# Patient Record
Sex: FEMALE | Race: WHITE | NOT HISPANIC OR LATINO | URBAN - METROPOLITAN AREA
[De-identification: names, ages, dates, MRNs, and addresses within clinical notes are randomized per-mention and may not be internally consistent; named-entity substitution may affect disease eponyms.]

---

## 2021-10-14 ENCOUNTER — GYNECOLOGY VISIT (OUTPATIENT)
Dept: OBGYN | Facility: CLINIC | Age: 31
End: 2021-10-14
Payer: COMMERCIAL

## 2021-10-14 ENCOUNTER — HOSPITAL ENCOUNTER (OUTPATIENT)
Facility: MEDICAL CENTER | Age: 31
End: 2021-10-14
Attending: OBSTETRICS & GYNECOLOGY
Payer: COMMERCIAL

## 2021-10-14 VITALS — WEIGHT: 165 LBS | SYSTOLIC BLOOD PRESSURE: 135 MMHG | DIASTOLIC BLOOD PRESSURE: 62 MMHG

## 2021-10-14 DIAGNOSIS — Z11.3 SCREENING FOR VENEREAL DISEASE: ICD-10-CM

## 2021-10-14 DIAGNOSIS — Z12.4 SCREENING FOR CERVICAL CANCER: ICD-10-CM

## 2021-10-14 DIAGNOSIS — Z01.419 WELL WOMAN EXAM WITH ROUTINE GYNECOLOGICAL EXAM: ICD-10-CM

## 2021-10-14 PROCEDURE — 87624 HPV HI-RISK TYP POOLED RSLT: CPT

## 2021-10-14 PROCEDURE — 87591 N.GONORRHOEAE DNA AMP PROB: CPT

## 2021-10-14 PROCEDURE — 99385 PREV VISIT NEW AGE 18-39: CPT | Performed by: OBSTETRICS & GYNECOLOGY

## 2021-10-14 PROCEDURE — 87491 CHLMYD TRACH DNA AMP PROBE: CPT

## 2021-10-14 PROCEDURE — 88175 CYTOPATH C/V AUTO FLUID REDO: CPT

## 2021-10-14 NOTE — PROGRESS NOTES
Subjective     Becka Arora is a 31 y.o. female who presents with Gynecologic Exam        CC: annual exam    HPI: 31-year-old  0, last menstrual period 2021, using Mirena IUD for contraception, presents for annual exam.  The patient states she has had her Mirena IUD for approximately 3 years.  Menses are currently every 47 to 72 days, lasting 2 days.  She has occasional cramping associated with the IUD.  She states the strings are coiled up in the cervix, and position has been checked by ultrasound.  She reports history of one abnormal Pap smear in 2017, which was retested and came back normal.  She requests screening for sexually transmitted infections.  Her partner has been evaluated for penile discharge, was found to have mycoplasma.  Patient denies discharge or vaginal lesions.  Family history is unremarkable for breast cancer.    History reviewed. No pertinent past medical history.    Past Surgical History:   Procedure Laterality Date   • ACL REPAIR     • CHOLECYSTECTOMY         No current outpatient medications on file.    Patient has no known allergies.    Family History   Problem Relation Age of Onset   • No Known Problems Mother    • Heart Disease Father        Social History     Socioeconomic History   • Marital status: Single     Spouse name: Not on file   • Number of children: Not on file   • Years of education: Not on file   • Highest education level: Not on file   Occupational History   • Not on file   Tobacco Use   • Smoking status: Never Smoker   • Smokeless tobacco: Never Used   Vaping Use   • Vaping Use: Never used   Substance and Sexual Activity   • Alcohol use: Yes   • Drug use: Never   • Sexual activity: Yes     Partners: Male     Birth control/protection: I.U.D.   Other Topics Concern   • Not on file   Social History Narrative   • Not on file     Social Determinants of Health     Financial Resource Strain:    • Difficulty of Paying Living Expenses:    Food Insecurity:    •  Worried About Running Out of Food in the Last Year:    • Ran Out of Food in the Last Year:    Transportation Needs:    • Lack of Transportation (Medical):    • Lack of Transportation (Non-Medical):    Physical Activity:    • Days of Exercise per Week:    • Minutes of Exercise per Session:    Stress:    • Feeling of Stress :    Social Connections:    • Frequency of Communication with Friends and Family:    • Frequency of Social Gatherings with Friends and Family:    • Attends Synagogue Services:    • Active Member of Clubs or Organizations:    • Attends Club or Organization Meetings:    • Marital Status:    Intimate Partner Violence:    • Fear of Current or Ex-Partner:    • Emotionally Abused:    • Physically Abused:    • Sexually Abused:        OB History    Para Term  AB Living   0 0 0 0 0 0   SAB TAB Ectopic Molar Multiple Live Births   0 0 0 0 0 0       ROS REVIEW OF SYSTEMS:    Pertinent positives and negatives mentioned in HPI.    All other systems reviewed and are negative or noncontributory.           Objective     /62 (BP Location: Right arm, Patient Position: Sitting)   Wt 74.8 kg (165 lb)   LMP 2021   Breastfeeding No      Physical Exam      GENERAL: Alert, in no apparent distress  PSYCHIATRIC: Appropriate affect, intact insight and judgement.  NECK:  Nontender, no masses.  No Thyromegaly or nodules. No lymphadenopathy.  RESPIRATORY: Normal respiratory effort.  Lungs clear to auscultation.   CARDIOVASCULAR: RRR, no murmur, gallop, or rub.  ABDOMEN: Soft, nontender, nondistended.  No palpable masses.  No rebound or guarding.  No inguinal lymphadenopathy.  No hepatosplenomegaly.  No hernias.  BACK: No CVA tenderness  EXTREMITIES: No edema  SKIN: No rash    BREAST: No masses or tenderness.  No skin changes.  No nipple inversion or discharge. No axillary lymphadenopathy.      GENITOURINARY:  Normal external genitalia, no lesions.  Normal urethral meatus, no masses or  tenderness.  Normal bladder without fullness or masses.  Vagina well estrogenized, no vaginal discharge or lesions.  Cervix without lesions or discharge, nontender.  IUD strings not visualized.  Uterus normal size, shape, and contour, nontender.  Adnexa nontender, no masses.  Normal anus and perineum.    Rectal Exam - not indicated.            Assessment & Plan        1. Well woman exam with routine gynecological exam  Reviewed monthly self breast exams and need for yearly screening mammograms starting at age 40.  Discussed calcium intake, Vitamin D,  and weight bearing exercise for bone health.  Mirena IUD in place -strings not visualized, patient states proper position has been verified with ultrasound.    2. Screening for cervical cancer  - THINPREP PAP W/HPV AND CTNG; Future    3. Screening for venereal disease   Screening for sexually transmitted infections per patient request  - THINPREP PAP W/HPV AND CTNG; Future  - T.PALLIDUM AB EIA; Future  - HIV AG/AB COMBO ASSAY SCREENING; Future  - HEP C VIRUS ANTIBODY; Future        Follow-up as needed

## 2021-10-15 DIAGNOSIS — Z12.4 SCREENING FOR CERVICAL CANCER: ICD-10-CM

## 2021-10-15 DIAGNOSIS — Z11.3 SCREENING FOR VENEREAL DISEASE: ICD-10-CM

## 2021-10-16 LAB
C TRACH DNA GENITAL QL NAA+PROBE: NEGATIVE
CYTOLOGY REG CYTOL: NORMAL
HPV HR 12 DNA CVX QL NAA+PROBE: NEGATIVE
HPV16 DNA SPEC QL NAA+PROBE: NEGATIVE
HPV18 DNA SPEC QL NAA+PROBE: NEGATIVE
N GONORRHOEA DNA GENITAL QL NAA+PROBE: NEGATIVE
SPECIMEN SOURCE: NORMAL
SPECIMEN SOURCE: NORMAL

## 2021-10-23 ENCOUNTER — PATIENT MESSAGE (OUTPATIENT)
Dept: OBGYN | Facility: CLINIC | Age: 31
End: 2021-10-23

## 2021-10-26 RX ORDER — METRONIDAZOLE 500 MG/1
500 TABLET ORAL 2 TIMES DAILY
Qty: 14 TABLET | Refills: 0 | Status: SHIPPED | OUTPATIENT
Start: 2021-10-26 | End: 2023-09-06

## 2023-09-04 SDOH — HEALTH STABILITY: PHYSICAL HEALTH: ON AVERAGE, HOW MANY MINUTES DO YOU ENGAGE IN EXERCISE AT THIS LEVEL?: 30 MIN

## 2023-09-04 SDOH — ECONOMIC STABILITY: HOUSING INSECURITY
IN THE LAST 12 MONTHS, WAS THERE A TIME WHEN YOU DID NOT HAVE A STEADY PLACE TO SLEEP OR SLEPT IN A SHELTER (INCLUDING NOW)?: NO

## 2023-09-04 SDOH — ECONOMIC STABILITY: INCOME INSECURITY: IN THE LAST 12 MONTHS, WAS THERE A TIME WHEN YOU WERE NOT ABLE TO PAY THE MORTGAGE OR RENT ON TIME?: NO

## 2023-09-04 SDOH — ECONOMIC STABILITY: HOUSING INSECURITY: IN THE LAST 12 MONTHS, HOW MANY PLACES HAVE YOU LIVED?: 2

## 2023-09-04 SDOH — ECONOMIC STABILITY: FOOD INSECURITY: WITHIN THE PAST 12 MONTHS, THE FOOD YOU BOUGHT JUST DIDN'T LAST AND YOU DIDN'T HAVE MONEY TO GET MORE.: NEVER TRUE

## 2023-09-04 SDOH — ECONOMIC STABILITY: TRANSPORTATION INSECURITY
IN THE PAST 12 MONTHS, HAS LACK OF TRANSPORTATION KEPT YOU FROM MEETINGS, WORK, OR FROM GETTING THINGS NEEDED FOR DAILY LIVING?: NO

## 2023-09-04 SDOH — HEALTH STABILITY: PHYSICAL HEALTH: ON AVERAGE, HOW MANY DAYS PER WEEK DO YOU ENGAGE IN MODERATE TO STRENUOUS EXERCISE (LIKE A BRISK WALK)?: 3 DAYS

## 2023-09-04 SDOH — ECONOMIC STABILITY: TRANSPORTATION INSECURITY
IN THE PAST 12 MONTHS, HAS THE LACK OF TRANSPORTATION KEPT YOU FROM MEDICAL APPOINTMENTS OR FROM GETTING MEDICATIONS?: NO

## 2023-09-04 SDOH — ECONOMIC STABILITY: FOOD INSECURITY: WITHIN THE PAST 12 MONTHS, YOU WORRIED THAT YOUR FOOD WOULD RUN OUT BEFORE YOU GOT MONEY TO BUY MORE.: NEVER TRUE

## 2023-09-04 SDOH — ECONOMIC STABILITY: TRANSPORTATION INSECURITY
IN THE PAST 12 MONTHS, HAS LACK OF RELIABLE TRANSPORTATION KEPT YOU FROM MEDICAL APPOINTMENTS, MEETINGS, WORK OR FROM GETTING THINGS NEEDED FOR DAILY LIVING?: NO

## 2023-09-04 SDOH — ECONOMIC STABILITY: INCOME INSECURITY: HOW HARD IS IT FOR YOU TO PAY FOR THE VERY BASICS LIKE FOOD, HOUSING, MEDICAL CARE, AND HEATING?: NOT HARD AT ALL

## 2023-09-04 SDOH — HEALTH STABILITY: MENTAL HEALTH
STRESS IS WHEN SOMEONE FEELS TENSE, NERVOUS, ANXIOUS, OR CAN'T SLEEP AT NIGHT BECAUSE THEIR MIND IS TROUBLED. HOW STRESSED ARE YOU?: TO SOME EXTENT

## 2023-09-04 ASSESSMENT — SOCIAL DETERMINANTS OF HEALTH (SDOH)
ARE YOU MARRIED, WIDOWED, DIVORCED, SEPARATED, NEVER MARRIED, OR LIVING WITH A PARTNER?: LIVING WITH PARTNER
HOW OFTEN DO YOU ATTEND CHURCH OR RELIGIOUS SERVICES?: 1 TO 4 TIMES PER YEAR
HOW HARD IS IT FOR YOU TO PAY FOR THE VERY BASICS LIKE FOOD, HOUSING, MEDICAL CARE, AND HEATING?: NOT HARD AT ALL
IN A TYPICAL WEEK, HOW MANY TIMES DO YOU TALK ON THE PHONE WITH FAMILY, FRIENDS, OR NEIGHBORS?: ONCE A WEEK
HOW OFTEN DO YOU GET TOGETHER WITH FRIENDS OR RELATIVES?: ONCE A WEEK
DO YOU BELONG TO ANY CLUBS OR ORGANIZATIONS SUCH AS CHURCH GROUPS UNIONS, FRATERNAL OR ATHLETIC GROUPS, OR SCHOOL GROUPS?: YES
IN A TYPICAL WEEK, HOW MANY TIMES DO YOU TALK ON THE PHONE WITH FAMILY, FRIENDS, OR NEIGHBORS?: ONCE A WEEK
HOW OFTEN DO YOU HAVE SIX OR MORE DRINKS ON ONE OCCASION: LESS THAN MONTHLY
HOW OFTEN DO YOU HAVE A DRINK CONTAINING ALCOHOL: MONTHLY OR LESS
DO YOU BELONG TO ANY CLUBS OR ORGANIZATIONS SUCH AS CHURCH GROUPS UNIONS, FRATERNAL OR ATHLETIC GROUPS, OR SCHOOL GROUPS?: YES
WITHIN THE PAST 12 MONTHS, YOU WORRIED THAT YOUR FOOD WOULD RUN OUT BEFORE YOU GOT THE MONEY TO BUY MORE: NEVER TRUE
HOW OFTEN DO YOU ATTENT MEETINGS OF THE CLUB OR ORGANIZATION YOU BELONG TO?: MORE THAN 4 TIMES PER YEAR
HOW OFTEN DO YOU GET TOGETHER WITH FRIENDS OR RELATIVES?: ONCE A WEEK
HOW MANY DRINKS CONTAINING ALCOHOL DO YOU HAVE ON A TYPICAL DAY WHEN YOU ARE DRINKING: 1 OR 2
HOW OFTEN DO YOU ATTEND CHURCH OR RELIGIOUS SERVICES?: 1 TO 4 TIMES PER YEAR
ARE YOU MARRIED, WIDOWED, DIVORCED, SEPARATED, NEVER MARRIED, OR LIVING WITH A PARTNER?: LIVING WITH PARTNER
HOW OFTEN DO YOU ATTENT MEETINGS OF THE CLUB OR ORGANIZATION YOU BELONG TO?: MORE THAN 4 TIMES PER YEAR

## 2023-09-04 ASSESSMENT — LIFESTYLE VARIABLES
HOW OFTEN DO YOU HAVE A DRINK CONTAINING ALCOHOL: MONTHLY OR LESS
SKIP TO QUESTIONS 9-10: 0
HOW OFTEN DO YOU HAVE SIX OR MORE DRINKS ON ONE OCCASION: LESS THAN MONTHLY
HOW MANY STANDARD DRINKS CONTAINING ALCOHOL DO YOU HAVE ON A TYPICAL DAY: 1 OR 2
AUDIT-C TOTAL SCORE: 2

## 2023-09-06 ENCOUNTER — OFFICE VISIT (OUTPATIENT)
Dept: SPORTS MEDICINE | Facility: CLINIC | Age: 33
End: 2023-09-06
Payer: COMMERCIAL

## 2023-09-06 ENCOUNTER — APPOINTMENT (OUTPATIENT)
Dept: RADIOLOGY | Facility: IMAGING CENTER | Age: 33
End: 2023-09-06
Attending: FAMILY MEDICINE
Payer: COMMERCIAL

## 2023-09-06 VITALS
BODY MASS INDEX: 27.49 KG/M2 | SYSTOLIC BLOOD PRESSURE: 116 MMHG | WEIGHT: 165 LBS | HEART RATE: 77 BPM | TEMPERATURE: 97.9 F | OXYGEN SATURATION: 98 % | DIASTOLIC BLOOD PRESSURE: 74 MMHG | RESPIRATION RATE: 16 BRPM | HEIGHT: 65 IN

## 2023-09-06 DIAGNOSIS — M54.2 CHRONIC NECK PAIN: ICD-10-CM

## 2023-09-06 DIAGNOSIS — G89.29 CHRONIC NECK PAIN: ICD-10-CM

## 2023-09-06 PROCEDURE — 3078F DIAST BP <80 MM HG: CPT | Performed by: FAMILY MEDICINE

## 2023-09-06 PROCEDURE — 99213 OFFICE O/P EST LOW 20 MIN: CPT | Performed by: FAMILY MEDICINE

## 2023-09-06 PROCEDURE — 3074F SYST BP LT 130 MM HG: CPT | Performed by: FAMILY MEDICINE

## 2023-09-06 PROCEDURE — 72040 X-RAY EXAM NECK SPINE 2-3 VW: CPT | Mod: TC | Performed by: RADIOLOGY

## 2023-09-06 ASSESSMENT — ENCOUNTER SYMPTOMS: FEVER: 0

## 2023-09-06 NOTE — PROGRESS NOTES
Subjective:     Becka Arora is a 32 y.o. female who presents for Neck Pain (Neck & head pain )    HPI  Pt presents for evaluation of chronic neck pain   Pain starts in the posterior upper neck and radiates into the head at times  Pain is usually on the right    Also has pain in the shoulder blade area, more often right   Symptoms are worse after she has been traveling (travels for work)   Neck is painful, but without stiffness   No swelling, bruising, or erythema in the area     Has headaches at times and feels that they are related to her neck pain   Treated for migraines with abortive agents in the past and didn't help   Headaches can last half of a day up to 2 days   Has some light sensitivity during headache   Has a pulsing type pain in the head   Takes Excedrin and helps   Thinks that she had a CT of her head in the past, but might of been an MRI and also might have also been on the neck instead of the head    Worked with a PT in the past (about 8 year ago) who was able to temporarily improve headaches     Review of Systems   Constitutional:  Negative for fever.   Skin:  Negative for rash.       PMH:  has no past medical history of Addisons disease ([facility]), Adrenal disorder ([facility]), Allergy, Anemia, Anxiety, Arrhythmia, Arthritis, Asthma, Blood transfusion without reported diagnosis, Cancer ([facility]), Cataract, CHF (congestive heart failure) ([facility]), Clotting disorder ([facility]), COPD (chronic obstructive pulmonary disease) ([facility]), Cushings syndrome ([facility]), Depression, Diabetes ([facility]), Diabetic neuropathy ([facility]), GERD (gastroesophageal reflux disease), Glaucoma, Goiter, Head ache, Heart attack ([facility]), Heart murmur, HIV (human immunodeficiency virus infection) ([facility]), Hyperlipidemia, Hypertension, IBD (inflammatory bowel disease), Kidney disease, Meningitis, Migraine, Muscle disorder, Osteoporosis, Parathyroid disorder (HCC), Pituitary disease (HCC), Pulmonary emphysema (HCC), Seizure ([facility]), Sickle cell disease ([facility]), Stroke  "(HCC), Substance abuse (HCC), Thyroid disease, Tuberculosis, or Urinary tract infection.  MEDS: No current outpatient medications on file.  ALLERGIES: No Known Allergies  SURGHX:   Past Surgical History:   Procedure Laterality Date    CHOLECYSTECTOMY      REPAIR, KNEE, ACL       SOCHX:  reports that she has never smoked. She has never used smokeless tobacco. She reports current alcohol use. She reports that she does not use drugs.     Objective:   /74 (BP Location: Left arm, Patient Position: Sitting, BP Cuff Size: Adult)   Pulse 77   Temp 36.6 °C (97.9 °F) (Temporal)   Resp 16   Ht 1.651 m (5' 5\")   Wt 74.8 kg (165 lb)   SpO2 98%   BMI 27.46 kg/m²     Physical Exam  Constitutional:       General: She is not in acute distress.     Appearance: She is well-developed. She is not diaphoretic.   Pulmonary:      Effort: Pulmonary effort is normal.   Neurological:      Mental Status: She is alert.     Neck  General: No asymmetry, bruising, or erythema appreciated  ROM: FROM flex/extend/lateral bend/lateral rotation  Palpation: No TTP of spinous processes, no step-offs appreciated, +TTP of paraspinal muscles mostly in the right side and mostly at the base of the occipital region.  No significant tenderness over the parascapular region  Special tests: Neg Spurling's  Neuro: Sensation intact     Assessment/Plan:   Assessment    1. Chronic neck pain  - DX-CERVICAL SPINE-2 OR 3 VIEWS; Future  - Referral to Physical Therapy    Patient with chronic cervical pain and also chronic headaches.  Suspect that these are cervicogenic headaches, though they also have qualities of migraine.  Suspect that neck pain is triggering migraines and they are indeed separate entities.  Patient has great range of motion and function, and there is no concern for meningitis at this time.  X-rays in office obtained which are largely within normal limits.  Recommended working with physical therapist for a few weeks to see if pain can be " improved quite a bit.  As long as pain is improving, would not recommend additional advanced imaging of the neck.  However, if the pain is not improving quite a bit with PT, may need to consider further evaluation of the neck with MRI.  If neck pain is improving and headaches also improved, may not need any specialty referral for headaches.  However, if neck pain is improving and headaches continue, may need to consider referral to neurology.

## 2023-09-25 ENCOUNTER — PHYSICAL THERAPY (OUTPATIENT)
Dept: PHYSICAL THERAPY | Facility: MEDICAL CENTER | Age: 33
End: 2023-09-25
Attending: FAMILY MEDICINE
Payer: COMMERCIAL

## 2023-09-25 DIAGNOSIS — M54.2 CHRONIC NECK PAIN: ICD-10-CM

## 2023-09-25 DIAGNOSIS — G89.29 CHRONIC NECK PAIN: ICD-10-CM

## 2023-09-25 PROCEDURE — 97162 PT EVAL MOD COMPLEX 30 MIN: CPT

## 2023-09-25 PROCEDURE — 97110 THERAPEUTIC EXERCISES: CPT

## 2023-09-25 ASSESSMENT — ENCOUNTER SYMPTOMS
QUALITY: DULL ACHE
PAIN SCALE AT LOWEST: 0
POSTURAL HEADACHE: 1
PAIN SCALE AT HIGHEST: 7
PAIN SCALE: 2

## 2023-09-25 NOTE — OP THERAPY EVALUATION
"  Outpatient Physical Therapy  INITIAL EVALUATION    [facility]  [address]  [place] [address]  Phone:  [phone]  Fax:  [phone]    Date of Evaluation: 09/25/2023    Patient: Velma Arora  YOB: 1990  MRN: [id number]     Referring Provider: Cruz Manning M.D.  [address]  [address]  [place] [address]   Referring Diagnosis Cervicalgia [M54.2];Other chronic pain [G89.29]     Time Calculation  Start time: 0815  Stop time: 0910 Time Calculation (min): 55 minutes         Chief Complaint: Neck Problem    Visit Diagnoses     ICD-10-CM   1. Chronic neck pain  M54.2    G89.29       Date of onset of impairment: Multiple active episodes found    Subjective:   History of Present Illness:     Mechanism of injury:  Patient is a 32 year old female with a PMH including: ACL repair, cholecystectomy. Pt reporting had fall with snowboarding with head impact but was \"fine after a day.\"    Pt presents to therapy with complaints of chronic neck pain in upper neck with occasional radiation into head R>L. Pt reporting has had migraines for ten years; three weeks ago has had daily headaches. Additionally pain in R shoulder blade region. Pos for headaches. Per Dr. Manning's note on 9/6/23, \"Treated for migraines with abortive agents in the past and didn't help   Headaches can last half of a day up to 2 days   Has some light sensitivity during headache   Has a pulsing type pain in the head   Takes Excedrin and helps   Thinks that she had a CT of her head in the past, but might of been an MRI and also might have also been on the neck instead of the head   Worked with a PT in the past (about 8 year ago) who was able to temporarily improve headaches;\" this occurred in high school per pt report. Pt reporting feels like eyebrow \"has a bruise.\" Pt endorsing B hands fall asleep with sleeping; is able to decrease with getting up to " "change position. Pt reporting frequency is increasing; daily prev 1x/week. Intensity unchanged. Vision changes rare; denies dizziness other than with position changes.     Currently denies changes in bowel and bladder, saddle anesthesia, significant weight changes, chills/night sweats, nausea and vomiting, and unexplained fatigue. Denies history of cancer. Pt consents to evaluation and treatment today.           Headaches:  postural headache  Sleep disturbance:  Interrupted sleep  Pain:     Current pain ratin    At best pain ratin    At worst pain ratin    Location:  Upper neck with occasional radiation into head R>L. Additionally pain in R shoulder blade region. Pos for headaches.    Quality:  Dull ache (throbbing, pulsing)    Progression:  Worsening    Pain Comments::  Aggravating: traveling, reading, difficulty concentrating, driving, sleeping    Relieving: changing neck position-lying down side with neck \"at certain angle.\" Excedrin   Diagnostic Tests:     Diagnostic Tests Comments:  Cervical x ray 23:   FINDINGS: Alignment of the cervical spine is straight.  Vertebral body heights are preserved.  Intervertebral disc spaces preserved.  Osteophyte present anteriorly at the C3-4 level.  Small osteophytes of the C4-5 and C5-6 levels.  The facet joints show normal alignment.  Prevertebral soft tissues are within normal limits.  Odontoid and lateral masses of C1 are intact.  Cervicothoracic junction is intact.     IMPRESSION:     1.  Straightening and mild multilevel degenerative changes cervical spine.  Somewhat prominent anterior osteophytes at C3-4.  2.  No fracture or subluxation.    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  Past Surgical History:   Procedure Laterality Date    CHOLECYSTECTOMY      REPAIR, KNEE, ACL       Social History     Tobacco Use    Smoking status: Never    Smokeless tobacco: Never   Substance Use Topics    Alcohol use: Yes     Family and Occupational History     Socioeconomic History    Marital status: Single     Spouse name: Not on file    Number of children: Not on file    Years of education: Not on file    Highest education level: Bachelor's degree (e.g., BA, AB, BS)   Occupational History    Not on file       Objective     Postural Observations  Seated posture: poor  Correction of posture: has no consistent effect    Additional Postural Observation Details  Forward head and rounded shoulders    Shoulder Screen    Shoulder active range of motion within functional limits.  Shoulder range of motion within functional limits with the following exceptions: Tightness at end ranges without increase in s/s     Neurological Testing     Dermatome testing   Cervical (left)   All left cervical dermatomes intact    Cervical (right)   All right cervical dermatomes intact    Palpation     Additional Palpation Details  Palpation to suboccipitals with reproduction of R orbital pain     Active Range of Motion     Cervical Spine   Flexion: within functional limits  Extension: within functional limits  Retraction: within functional limits (pulling in shoulder blade)  Left lateral flexion: within functional limits  Right lateral flexion: within functional limits  Left rotation: decreased (56 deg)  Right rotation: within functional limits (70 deg)    Upper Cervical   Left lateral flexion: within functional limits  Right lateral flexion: within functional limits    Additional Active Range of Motion Details  Decreased range of motion with t/s side  "bending and rotation L>R        Strength:      Additional Strength Details  DNF testing: fatigues rapidly with compensation of c/s flexion     Tests     Left Shoulder   Negative Spurling's sign.     Right Shoulder   Negative Spurling's sign.         Therapeutic Exercises (CPT 39773):     1. pt education, re: posture education; PT exam findings    2. new hep as below      Therapeutic Exercise Summary: Access Code: [email or web address]  URL: [email or web address]  Date: 09/25/2023  Prepared by: Julio César Monzon    Exercises  - Correct Seated Posture  - 7 x weekly  - Neck Retraction  - 4-5 x daily - 7 x weekly - 1 sets - 10 reps  - Sidelying Thoracic Lumbar Rotation  - 2 x daily - 7 x weekly - 1 sets - 15 reps  - Open Book Chest Stretch on Towel Roll  - 2 x daily - 7 x weekly - 1 sets - 15 reps  - Shoulder External Rotation and Scapular Retraction with Resistance  - 2 x daily - 7 x weekly - 1 sets - 1-2 min  hold    Pt performed these exercises with instruction and SPV.  Provided handout with these exercises for daily HEP.          Time-based treatments/modalities:    Physical Therapy Timed Treatment Charges  Therapeutic exercise minutes (CPT 58255): 25 minutes      Assessment, Response and Plan:   Impairments: abnormal or restricted ROM, activity intolerance, impaired physical strength and lacks appropriate home exercise program    Assessment details:  Patient, \"Laura-see\" is a pleasant and cooperative 32 year old female who presents to therapy with c/o chronic worsening neck pain and headaches, incr frequency over past three weeks without clear JESSE. She is currently working a desk job.   Exam findings suggestive of cervicogenic headaches; additional findings of fair postural awareness, t/s hypomobility, strength deficits at DNF and periscapular region. Pt may benefit from skilled physical therapy in order to address above impairments in order to improve QOL and return to reported ADL's.     Barriers to therapy:  " Financial  Other barriers to therapy:  High ins estimate per visit code  Prognosis: good    Goals:   Short Term Goals:   1. Pt will be independent with written HEP.      Short term goal time span:  2-4 weeks      Long Term Goals:    1. Pt will be independent with written HEP.  2. Pt will have a sig improvement in NDI score (eval: 15)  3. Pt will have 70% improvement in sleep hygiene.  4. Pt will be able to tolerate whole work shift without onset of headache consistently at least 75% of the time or greater.      Long term goal time span:  6-8 weeks    Plan:   Therapy options:  Physical therapy treatment to continue  Planned therapy interventions:  Neuromuscular Re-education (CPT 74850), E Stim Unattended (CPT 65205), Manual Therapy (CPT 11800), Mechanical Traction (CPT 96181) and Therapeutic Exercise (CPT 55840)  Frequency:  1x week  Duration in weeks:  8  Discussed with:  Patient  Plan details:  UPOC: 11/24/23          Functional Assessment Used  Neck Disability Total: 15     Referring provider co-signature:  I have reviewed this plan of care and my co-signature certifies the need for services.    Certification Period: 09/25/2023 to  11/24/23    Physician Signature: ________________________________ Date: ______________

## 2023-09-29 ENCOUNTER — APPOINTMENT (OUTPATIENT)
Dept: PHYSICAL THERAPY | Facility: MEDICAL CENTER | Age: 33
End: 2023-09-29
Payer: COMMERCIAL

## 2023-10-03 ENCOUNTER — PHYSICAL THERAPY (OUTPATIENT)
Dept: PHYSICAL THERAPY | Facility: MEDICAL CENTER | Age: 33
End: 2023-10-03
Attending: FAMILY MEDICINE
Payer: COMMERCIAL

## 2023-10-03 DIAGNOSIS — G89.29 CHRONIC NECK PAIN: ICD-10-CM

## 2023-10-03 DIAGNOSIS — M54.2 CHRONIC NECK PAIN: ICD-10-CM

## 2023-10-03 PROCEDURE — 97014 ELECTRIC STIMULATION THERAPY: CPT

## 2023-10-03 PROCEDURE — 97110 THERAPEUTIC EXERCISES: CPT

## 2023-10-03 NOTE — OP THERAPY DAILY TREATMENT
Outpatient Physical Therapy  DAILY TREATMENT     [facility]  [address]  [place] [address]  Phone:  [phone]  Fax:  [phone]    Date: 10/03/2023    Patient: Velma Arora  YOB: 1990  MRN: [id number]     Time Calculation    Start time: 0948  Stop time: 1043 Time Calculation (min): 55 minutes         Chief Complaint: Neck Problem    Visit #: 2    SUBJECTIVE:  Pt reporting feels okay today. Stating her eyebrow is still sore to the touch.       OBJECTIVE:  Current objective measures:   Today:   Hypomobile CTJ-T4      From eval:  Additional Active Range of Motion Details  Decreased range of motion with t/s side bending and rotation L>R     Strength:    Additional Strength Details  DNF testing: fatigues rapidly with compensation of c/s flexion     Active Range of Motion      Cervical Spine   Flexion: within functional limits  Extension: within functional limits  Retraction: within functional limits (pulling in shoulder blade)  Left lateral flexion: within functional limits  Right lateral flexion: within functional limits  Left rotation: decreased (56 deg)  Right rotation: within functional limits (70 deg)     Upper Cervical   Left lateral flexion: within functional limits  Right lateral flexion: within functional limits     Additional Active Range of Motion Details  Decreased range of motion with t/s side bending and rotation L>R     Palpation to suboccipitals with reproduction of R orbital pain     Therapeutic Exercises (CPT 69705):     1. UBE    2. review of hep as below    3. rows, pink->green TB, x10 with 5 sec hold, hep    4. lat pull downs, pink->green TB, x10 with 5 sec hold, hep    5. Qped alt GH flexion, 10x 5 sec hold ea, visible muscle juddering B; hep    6. pt education, re: TENs unit for pain management      Therapeutic Exercise Summary: Access Code: [email or web address]  URL: [email or web address]  Date:  10/03/2023  Prepared by: Julio César Monzon    Exercises  - Correct Seated Posture  - 7 x weekly  - Neck Retraction  - 4-5 x daily - 7 x weekly - 1 sets - 10 reps  - Sidelying Thoracic Lumbar Rotation  - 2 x daily - 7 x weekly - 1 sets - 15 reps  - Open Book Chest Stretch on Towel Roll  - 2 x daily - 7 x weekly - 1 sets - 15 reps  - Shoulder External Rotation and Scapular Retraction with Resistance  - 2 x daily - 7 x weekly - 1 sets - 1-2 min  hold  - Standing Row with Resistance  - 3 x weekly - 2 sets - 10 reps - 5 sec hold  - Lat pull downs  - 3 x weekly - 2 sets - 10 reps - 5 sec hold  - Quadruped Alternating Shoulder Flexion  - 3 x weekly - 1-2 sets - 10 reps - 5 sec hold    Pt performed these exercises with instruction and SPV.  Provided handout with these exercises for daily HEP.        Therapeutic Treatments and Modalities:     1. E Stim Unattended (CPT 75549), IFC and mhp to c/s x 15 min, trial of pain management    2. Manual Therapy (CPT 75174), see below    Therapeutic Treatment and Modalities Summary: Manual:  CPA and rotational mobs from CTJ-T10 gr III with pt in prone followed by upward rotation and levator scap STM, then scapular release to R subscap in SL with pillow to ensure modesty. Lastly, gentle STM to suboccipitals and c/s traction     Time-based treatments/modalities:    Physical Therapy Timed Treatment Charges  Therapeutic exercise minutes (CPT 39953): 15 minutes      Pain rating (1-10) before treatment:  0/10        ASSESSMENT:   Response to treatment:   Pt demonstrating hypomobility from CTJ-T4 as noted during manual. Emphasis and cuing during session to ensure c/s retraction and neutral posture during all banded ex. Does fatigue in Qped with visible muscle juddering BUE's; will benefit from cont'd strengthening to periscap strengthening.       PLAN/RECOMMENDATIONS:   Plan for treatment: therapy treatment to continue next visit.  Planned interventions for next visit: continue with current  treatment.

## 2023-10-04 ENCOUNTER — APPOINTMENT (OUTPATIENT)
Dept: PHYSICAL THERAPY | Facility: MEDICAL CENTER | Age: 33
End: 2023-10-04
Payer: COMMERCIAL

## 2023-10-13 ENCOUNTER — APPOINTMENT (OUTPATIENT)
Dept: PHYSICAL THERAPY | Facility: MEDICAL CENTER | Age: 33
End: 2023-10-13
Payer: COMMERCIAL

## 2023-10-20 ENCOUNTER — APPOINTMENT (OUTPATIENT)
Dept: PHYSICAL THERAPY | Facility: MEDICAL CENTER | Age: 33
End: 2023-10-20
Payer: COMMERCIAL

## 2023-10-27 ENCOUNTER — APPOINTMENT (OUTPATIENT)
Dept: PHYSICAL THERAPY | Facility: MEDICAL CENTER | Age: 33
End: 2023-10-27
Payer: COMMERCIAL

## 2023-10-31 ENCOUNTER — APPOINTMENT (OUTPATIENT)
Dept: PHYSICAL THERAPY | Facility: MEDICAL CENTER | Age: 33
End: 2023-10-31
Attending: FAMILY MEDICINE
Payer: COMMERCIAL

## 2023-11-02 ENCOUNTER — APPOINTMENT (OUTPATIENT)
Dept: PHYSICAL THERAPY | Facility: MEDICAL CENTER | Age: 33
End: 2023-11-02
Attending: FAMILY MEDICINE
Payer: COMMERCIAL

## 2023-11-03 ENCOUNTER — APPOINTMENT (OUTPATIENT)
Dept: PHYSICAL THERAPY | Facility: MEDICAL CENTER | Age: 33
End: 2023-11-03
Payer: COMMERCIAL

## 2023-11-10 ENCOUNTER — APPOINTMENT (OUTPATIENT)
Dept: PHYSICAL THERAPY | Facility: MEDICAL CENTER | Age: 33
End: 2023-11-10
Payer: COMMERCIAL

## 2023-11-27 ENCOUNTER — APPOINTMENT (OUTPATIENT)
Dept: PHYSICAL THERAPY | Facility: MEDICAL CENTER | Age: 33
End: 2023-11-27
Attending: FAMILY MEDICINE
Payer: COMMERCIAL

## 2023-12-04 ENCOUNTER — APPOINTMENT (OUTPATIENT)
Dept: PHYSICAL THERAPY | Facility: MEDICAL CENTER | Age: 33
End: 2023-12-04
Attending: FAMILY MEDICINE
Payer: COMMERCIAL

## 2024-08-30 ENCOUNTER — OFFICE VISIT (OUTPATIENT)
Dept: URGENT CARE | Facility: CLINIC | Age: 34
End: 2024-08-30
Payer: COMMERCIAL

## 2024-08-30 VITALS
OXYGEN SATURATION: 97 % | TEMPERATURE: 97.9 F | RESPIRATION RATE: 14 BRPM | BODY MASS INDEX: 28.12 KG/M2 | SYSTOLIC BLOOD PRESSURE: 124 MMHG | WEIGHT: 175 LBS | HEIGHT: 66 IN | DIASTOLIC BLOOD PRESSURE: 70 MMHG | HEART RATE: 60 BPM

## 2024-08-30 DIAGNOSIS — K04.7 DENTAL INFECTION: ICD-10-CM

## 2024-08-30 ASSESSMENT — ENCOUNTER SYMPTOMS
SINUS PRESSURE: 0
FEVER: 0

## 2024-08-31 NOTE — PROGRESS NOTES
Subjective:   Becka Arora is a 33 y.o. female who presents today with   Chief Complaint   Patient presents with    Dental Pain     X 4 days, Rt lower side dental pain.     Dental Pain   This is a new problem. Episode onset: 4 days. The problem occurs constantly. The problem has been unchanged. Pertinent negatives include no difficulty swallowing, facial pain, fever, oral bleeding, sinus pressure or thermal sensitivity. She has tried NSAIDs for the symptoms. The treatment provided mild relief.     No recent injury or trauma.  Patient does have a call in with her dentist but is recently coming back from travel for work.    PMH:  has no past medical history of Addisons disease ([facility]), Adrenal disorder ([facility]), Allergy, Anemia, Anxiety, Arrhythmia, Arthritis, Asthma, Blood transfusion without reported diagnosis, Cancer ([facility]), Cataract, CHF (congestive heart failure) ([facility]), Clotting disorder ([facility]), COPD (chronic obstructive pulmonary disease) ([facility]), Cushings syndrome ([facility]), Depression, Diabetes ([facility]), Diabetic neuropathy ([facility]), GERD (gastroesophageal reflux disease), Glaucoma, Goiter, Head ache, Heart attack ([facility]), Heart murmur, HIV (human immunodeficiency virus infection) ([facility]), Hyperlipidemia, Hypertension, IBD (inflammatory bowel disease), Kidney disease, Meningitis, Migraine, Muscle disorder, Osteoporosis, Parathyroid disorder ([facility]), Pituitary disease ([facility]), Pulmonary emphysema ([facility]), Seizure ([facility]), Sickle cell disease ([facility]), Stroke ([facility]), Substance abuse ([facility]), Thyroid disease, Tuberculosis, or Urinary tract infection.  MEDS:   Current Outpatient Medications:     amoxicillin-clavulanate (AUGMENTIN) 875-125 MG Tab, Take 1 Tablet by mouth 2 times a day for 7 days., Disp: 14 Tablet, Rfl: 0  ALLERGIES: No Known Allergies  SURGHX:   Past Surgical History:   Procedure Laterality Date    CHOLECYSTECTOMY      REPAIR, KNEE, ACL       SOCHX:  reports that she has never smoked. She has never used smokeless tobacco. She  "reports current alcohol use. She reports that she does not use drugs.  FH: Reviewed with patient, not pertinent to this visit.     Review of Systems   Constitutional:  Negative for fever.   HENT:  Negative for sinus pressure.         Dental pain      Objective:   /70   Pulse 60   Temp 36.6 °C (97.9 °F) (Temporal)   Resp 14   Ht 1.664 m (5' 5.5\")   Wt 79.4 kg (175 lb)   SpO2 97%   BMI 28.68 kg/m²   Physical Exam  Vitals and nursing note reviewed.   Constitutional:       General: She is not in acute distress.     Appearance: Normal appearance. She is well-developed. She is not ill-appearing or toxic-appearing.   HENT:      Head: Normocephalic and atraumatic.      Comments: No notable facial swelling or erythema noted.  No submandibular swelling.     Right Ear: Hearing normal.      Left Ear: Hearing normal.      Mouth/Throat:      Mouth: Mucous membranes are moist. No oral lesions.      Pharynx: Uvula midline. Posterior oropharyngeal erythema present. No uvula swelling.      Tonsils: No tonsillar exudate or tonsillar abscesses.        Comments: Tenderness to palpation behind the posterior right lower back tooth.  No drainage or discharge.  Cardiovascular:      Rate and Rhythm: Normal rate.   Pulmonary:      Effort: Pulmonary effort is normal.   Musculoskeletal:      Comments: Normal movement in all 4 extremities   Skin:     General: Skin is warm and dry.   Neurological:      Mental Status: She is alert.      Coordination: Coordination normal.   Psychiatric:         Mood and Affect: Mood normal.         Assessment/Plan:   Assessment    1. Dental infection  - amoxicillin-clavulanate (AUGMENTIN) 875-125 MG Tab; Take 1 Tablet by mouth 2 times a day for 7 days.  Dispense: 14 Tablet; Refill: 0    Recommend patient continue with over-the-counter ibuprofen per 's instructions and we will also send an antibiotic at this time.  Recommend following up with dentist as soon as she can next week after the " holiday weekend.  Follow-up sooner with any new or worsening symptoms.    Differential diagnosis, natural history, supportive care, and indications for immediate follow-up discussed.   Patient given instructions and understanding of medications and treatment.    If not improving in 3-5 days, F/U with PCP or return to UC if symptoms worsen.    Patient agreeable to plan.        Please note that this dictation was created using voice recognition software. I have made every reasonable attempt to correct obvious errors, but I expect that there are errors of grammar and possibly content that I did not discover before finalizing the note.    Taj Gardner PA-C